# Patient Record
Sex: FEMALE | Race: WHITE
[De-identification: names, ages, dates, MRNs, and addresses within clinical notes are randomized per-mention and may not be internally consistent; named-entity substitution may affect disease eponyms.]

---

## 2017-03-30 ENCOUNTER — HOSPITAL ENCOUNTER (OUTPATIENT)
Dept: HOSPITAL 80 - FIMAGING | Age: 72
End: 2017-03-30
Attending: GENERAL ACUTE CARE HOSPITAL
Payer: COMMERCIAL

## 2017-03-30 DIAGNOSIS — R92.8: Primary | ICD-10-CM

## 2017-04-13 ENCOUNTER — HOSPITAL ENCOUNTER (OUTPATIENT)
Dept: HOSPITAL 80 - FIMAGING | Age: 72
End: 2017-04-13
Attending: INTERNAL MEDICINE
Payer: COMMERCIAL

## 2017-04-13 DIAGNOSIS — R92.8: ICD-10-CM

## 2017-04-13 DIAGNOSIS — Z12.39: Primary | ICD-10-CM

## 2017-04-21 ENCOUNTER — HOSPITAL ENCOUNTER (OUTPATIENT)
Dept: HOSPITAL 80 - FIMAGING | Age: 72
End: 2017-04-21
Attending: INTERNAL MEDICINE
Payer: COMMERCIAL

## 2017-04-21 DIAGNOSIS — N63: ICD-10-CM

## 2017-04-21 DIAGNOSIS — Z12.39: Primary | ICD-10-CM

## 2017-04-21 PROCEDURE — A9585 GADOBUTROL INJECTION: HCPCS

## 2017-04-21 PROCEDURE — 0159T: CPT

## 2017-04-21 PROCEDURE — C8908 MRI W/O FOL W/CONT, BREAST,: HCPCS

## 2018-01-11 ENCOUNTER — HOSPITAL ENCOUNTER (OUTPATIENT)
Dept: HOSPITAL 80 - BMCIMAGING | Age: 73
End: 2018-01-11
Attending: NURSE PRACTITIONER
Payer: COMMERCIAL

## 2018-01-11 DIAGNOSIS — R10.31: Primary | ICD-10-CM

## 2018-01-11 DIAGNOSIS — D25.9: ICD-10-CM

## 2018-02-23 ENCOUNTER — HOSPITAL ENCOUNTER (OUTPATIENT)
Dept: HOSPITAL 80 - BMCIMAGING | Age: 73
End: 2018-02-23
Attending: INTERNAL MEDICINE
Payer: COMMERCIAL

## 2018-02-23 DIAGNOSIS — E04.2: Primary | ICD-10-CM

## 2018-02-26 ENCOUNTER — HOSPITAL ENCOUNTER (OUTPATIENT)
Dept: HOSPITAL 80 - FIMAGING | Age: 73
End: 2018-02-26
Attending: PHYSICIAN ASSISTANT
Payer: COMMERCIAL

## 2018-02-26 DIAGNOSIS — M43.16: Primary | ICD-10-CM

## 2018-03-06 ENCOUNTER — HOSPITAL ENCOUNTER (OUTPATIENT)
Dept: HOSPITAL 80 - FED | Age: 73
Setting detail: OBSERVATION
Discharge: HOME | End: 2018-03-06
Attending: HOSPITALIST | Admitting: HOSPITALIST
Payer: COMMERCIAL

## 2018-03-06 VITALS — DIASTOLIC BLOOD PRESSURE: 54 MMHG | SYSTOLIC BLOOD PRESSURE: 101 MMHG

## 2018-03-06 DIAGNOSIS — R42: ICD-10-CM

## 2018-03-06 DIAGNOSIS — Z96.643: ICD-10-CM

## 2018-03-06 DIAGNOSIS — M45.9: ICD-10-CM

## 2018-03-06 DIAGNOSIS — E04.1: ICD-10-CM

## 2018-03-06 DIAGNOSIS — I65.23: ICD-10-CM

## 2018-03-06 DIAGNOSIS — R07.9: Primary | ICD-10-CM

## 2018-03-06 DIAGNOSIS — I10: ICD-10-CM

## 2018-03-06 DIAGNOSIS — K21.9: ICD-10-CM

## 2018-03-06 LAB
CK SERPL-CCNC: 256 IU/L (ref 0–156)
INR PPP: 0.94 (ref 0.83–1.16)
PLATELET # BLD: 191 10^3/UL (ref 150–400)
PROTHROMBIN TIME: 12.8 SEC (ref 12–15)

## 2018-03-06 PROCEDURE — 93880 EXTRACRANIAL BILAT STUDY: CPT

## 2018-03-06 PROCEDURE — G0378 HOSPITAL OBSERVATION PER HR: HCPCS

## 2018-03-06 PROCEDURE — 93017 CV STRESS TEST TRACING ONLY: CPT

## 2018-03-06 PROCEDURE — 99285 EMERGENCY DEPT VISIT HI MDM: CPT

## 2018-03-06 PROCEDURE — 71045 X-RAY EXAM CHEST 1 VIEW: CPT

## 2018-03-06 PROCEDURE — 93005 ELECTROCARDIOGRAM TRACING: CPT

## 2018-03-06 NOTE — PDDCSUM
Discharge Summary


Discharge Summary: 








Date of Admission: 3/6/18 9:30 a.m.


Date of Discharge: 3/6/18 4 p.m.





Discharge Diagnoses


1. Acute chest pain


2. Suspected GERD


3. High blood pressure


4. Goiter with hot nodule





Procedures


1. Exercise EKG stress test w/o inducible ischemia


2. Carotid/Vertebral US w/o any significant stenosis





Labs: TSH 0.9, dimer negative, trop negative x 2, , WBC 4,000, Hgb 16.3, 

Cr 0.7





Physical Exam: , AAOx3, pain 0/10, no distress, no tachypnea





Hospital Course: Ms. Lock presented w/ acute, central chest pain and was 

ruled out for ACS w/ negative troponins and no ischemia on EKG. She was ruled 

out for obstructive CAD w/ a normal EKG-exercise stress test, was ruled out for 

PE w/ neg dimer, and was ruled out for pulmonary etiology with negative CXR. 

Her chest discomfort is most likely 2/2 GERD, and she has had two episodes in 

the past several weeks. We discussed PPI preventative Rx, and she would prefer 

to trial PRN tums/H2 blocker, and I recommended she keep these on hand. She is 

already performing all non-pharmacologic methods of reducing GERD. Her BP was 

noted to be elevated on presentation, and I suspect that this is episodic 

elevated blood pressure which is situational and a direct response to 

discomfort. Her SBP delmer to 190 w/ exercise (likely 2/2 deconditioning) and was 

170 on presentation, but it improved to 110-120 w/o intervention, indicating 

that it is more of a response to stimulation/discomfort, as opposed to the 

cause of discomfort. Ms. Lock and I discussed this, and she has a detailed 

log of her BP values which she will continue, and share w/ her PCP. Outpatient 

ambulatory BP monitoring through [facility] may be useful to parse out 

whether she truly has hypertension. She has stopped her Methimazole over 

concerns with her BP, and I have encouraged her to discuss with Dr. Ernst in 

the short term, to avoid undertreating her hot nodule. In addition, the patient 

has been experiencing some facial paresthesias and frontal facial pressure/

ocular discomfort, and we performed anterior/posterior circulation US to ensure 

that there was no neurovascular cause. Her US was normal, and I recommend she 

continue this conversation with her PCP. 





Discharge medications: please see official discharge medication reconcilliation 

sheet in chart; of note, continue all home Rx.





Discharge instructions: please follow-up with PCP, Dr. Ernst, and Dr. Chan 

as scheduled. 





Of note, patient was evaluated by this physician on two separate encounters, > 

6 hours apart, on 3/6/18 for a combined H&P and DC after appropriate work-up 

had been performed.

## 2018-03-06 NOTE — EDPHY
H & P


Stated Complaint: CP off and on all night


Time Seen by Provider: 03/06/18 06:10


HPI/ROS: 





HPI





CHIEF COMPLAINT:  Chest discomfort





HISTORY OF PRESENT ILLNESS:  Patient is a 72-year-old female history of a goiter

, and no known history of cardiovascular disease she has never had an MI or 

stroke she presents emergency room with chest discomfort been on an off all 

night.  Intermittently.  Currently has had 4/10.  Describes it as a dull ache/

pressure in the center of her chest.  Sometimes she feels some discomfort in 

her left arm.  Denies any nausea vomiting.  Also reports on Friday she had some 

lightheadedness, high blood pressure and some chest discomfort.  She decided 

come the emergency room due to ongoing chest discomfort.  She did take her 

blood pressure at home and realized that was 170s.


Currently upon arrival she is hemodynamically stable no acute distress but does 

complain of 4/10 substernal chest discomfort.  Denies recent illness.  Denies 

pleuritic pain.  Denies cough or fever.





Past Medical History:  Thyroid disease goiter, hypertension





Past Surgical History:  Multiple previous surgeries including appendectomy and 

orthopedic surgeries.





Social History:  Denies drugs alcohol tobacco.





Family History:  Noncontributory








ROS   


REVIEW OF SYSTEMS:


A comprehensive 10 point review of systems is otherwise negative aside from 

elements mentioned in the history of present illness.








Exam   


Constitutional  appears well nontoxic no acute distress, triage nursing summary 

reviewed, vital signs reviewed, awake/alert. 


Eyes   normal conjunctivae and sclera, EOMI, PERRLA. 


HENT   neck shows a goiter present.  Prominent neck veins.  normal inspection, 

atraumatic, moist mucus membranes, no epistaxis, neck supple/ no meningismus, 

no raccoon eyes. 


Respiratory   clear to auscultation bilaterally, normal breath sounds, no 

respiratory distress, no wheezing. 


Cardiovascular   rate normal, regular rhythm, no murmur, no edema, distal 

pulses normal. 


Gastrointestinal   soft, non-tender, no rebound, no guarding, normal bowel 

sounds, no distension, no pulsatile mass. 


Genitourinary   no CVA tenderness. 


Musculoskeletal  no midline vertebral tenderness, full range of motion, no calf 

swelling, no tenderness of extremities, no meningismus, good pulses, 

neurovascularly intact.


Skin   pink, warm, & dry, no rash, skin atraumatic. 


Neurologic   awake, alert and oriented x 3, AAOx3, moves all 4 extremities 

equally, motor intact, sensory intact, CN II-XII intact, normal cerebellar, 

normal vision, normal speech. 


Psychiatric   normal mood/affect. 


Heme/Lymph/Immune   no lymphadenopathy.





Differential diagnosis includes but is not limited to:  ACS, atypical chest pain

, pneumothorax, pneumonia, pulmonary embolism, aortic dissection, congestive 

heart failure, tumor, musculoskeletal pain, esophageal pain, GERD, peptic ulcer 

disease, pancreatitis





Medical Decision Making:  Plan for this patient IV establishment obtain EKG in 

full cardiac monitor, check troponin D-dimer, chest x-ray, full-dose aspirin 

will be given additionally nitroglycerin to see if this improves her chest 

discomfort.  Rule out acute coronary syndrome.





Re-evaluation:








EKG interpretation by me on record in [facility] system.  Impression time of 

EKG 6:19 a.m., sinus rhythm rate of 89 I do not appreciate ST elevation.  No ST 

depression.  No significant T-wave abnormalities.








0722:  Re-evaluation at this time patient resting comfortably.  Full-dose 

aspirin and nitroglycerin for chest pain-free at this time.  Does feel little 

lightheaded from the nitroglycerin.  Vital signs are stable.


EKG is nonischemic.  Chest x-ray reviewed on appreciate acute cardiopulmonary 

disease.  Troponin D-dimer pending at this time.





I did see the hospitalist service agrees to admit this patient for chest pain 

evaluation.  Patient agrees for admission as well.


Source: Patient, Family





- Personal History


Current Tetanus/Diphtheria Vaccine: Yes


Current Tetanus Diphtheria and Acellular Pertussis (TDAP): Yes





- Medical/Surgical History


Hx Asthma: No


Hx Chronic Respiratory Disease: No


Hx Diabetes: No


Hx Cardiac Disease: No


Hx Renal Disease: No


Hx Cirrhosis: No


Hx Alcoholism: No


Hx HIV/AIDS: No


Hx Splenectomy or Spleen Trauma: No


Other PMH: breast biopsy, laminectomy, appendectomy, bilat knee replacement, 

tendon release in right ring finger, left thumb release, neurectomy left foot, 

right carpel tunnel, bunionectomy right foot, neurectomy left tenotomies





- Social History


Smoking Status: Never smoked


Constitutional: 


 Initial Vital Signs











Temperature (C)  36.4 C   03/06/18 06:09


 


Heart Rate  88   03/06/18 06:09


 


Respiratory Rate  16   03/06/18 06:09


 


Blood Pressure  165/105 H  03/06/18 06:09


 


O2 Sat (%)  96   03/06/18 06:09








 











O2 Delivery Mode               Room Air














Allergies/Adverse Reactions: 


 





acetaminophen [From Tylenol] Allergy (Verified 03/06/18 08:13)


 Fever


celecoxib [From Celebrex] Allergy (Verified 03/06/18 08:13)


 cough


diclofenac [From Voltaren] Allergy (Verified 03/06/18 08:13)


 hypertension


indomethacin [From Indocin] Allergy (Verified 03/06/18 08:13)


 stomach upset/mood


meperidine [From Demerol] Allergy (Verified 03/06/18 08:13)


 "instant emesis"


nabumetone [From Relafen] Allergy (Verified 03/06/18 08:13)


 Hives


oxycodone [From Percocet] Allergy (Verified 03/06/18 08:13)


 Vomiting


propoxyphene [From Darvon] Allergy (Verified 03/06/18 08:13)


 "instant emesis"


rofecoxib [From Vioxx] Allergy (Verified 03/06/18 08:13)


 becomes angry


sulfasalazine [From Azulfidine] Allergy (Verified 03/06/18 08:13)


 headache


sulindac [From Clinoril] Allergy (Verified 03/06/18 08:13)


 body aches


tramadol Allergy (Verified 03/06/18 08:13)


 extreme respiratory suppression/"weird feeling"


tolmentin Allergy (Uncoded 03/06/18 08:13)


 stomach upset








Home Medications: 














 Medication  Instructions  Recorded


 


C/E/Zn/Cu/OM3/DHA/EPA/LUT/ZEAX 1 each PO DAILY 03/06/18





[Preservision Areds 2 Softgel]  


 


Cholecalciferol Vit D3 [Vitamin D3 4,000 units PO DAILY 03/06/18





2000 units tab (OTC)]  


 


Herbals/Supplements -Info Only 1 ea PO DAILY 03/06/18


 


Multivitamins [Multivitamin (*)] 1 each PO DAILY 03/06/18


 


Omega-3 Fatty Acids [Fish Oil 1000 2,000 mg PO DAILY 03/06/18





mg (*)]  


 


Vitamin B Complex/Folic Acid 0.4 mg PO Q2D 03/06/18





[B-Complex Tablet]  














Medical Decision Making





- Data Points


Laboratory Results: 


 Laboratory Results





 03/06/18 06:45 





 03/06/18 06:45 








Medications Given: 


 








Discontinued Medications





Aspirin Buffered (Aspirin Ec)  325 mg PO EDNOW ONE


   Stop: 03/06/18 06:25


   Last Admin: 03/06/18 06:37 Dose:  325 mg


Enoxaparin Sodium (Lovenox)  40 mg SC DAILY [place]


   Stop: 09/02/18 08:59


   Last Admin: 03/06/18 13:26 Dose:  Not Given


Nitroglycerin (Nitrostat)  0.4 mg SL EDNOW ONE


   Stop: 03/06/18 06:25


   Last Admin: 03/06/18 06:49 Dose:  0.4 mg








Departure





- Departure


Disposition: [facility] Inpatient Acute


Clinical Impression: 


Chest pain


Qualifiers:


 Chest pain type: unspecified Qualified Code(s): R07.9 - Chest pain, unspecified





Condition: Good

## 2018-03-06 NOTE — CPEKG
Heart Rate: 89

RR Interval: 674

P-R Interval: 168

QRSD Interval: 92

QT Interval: 376

QTC Interval: 458

P Axis: 71

QRS Axis: 33

T Wave Axis: 53

EKG Severity - NORMAL ECG -

EKG Impression: SINUS RHYTHM

Electronically Signed By: Vinnie San 06-Mar-2018 06:41:18

## 2018-03-06 NOTE — CPR
[f 
rep st]



                                                  NONINVASIVE CARDIAC PROCEDURE 
REPORT





DATE OF PROCEDURE:  03/06/2018



STUDY PERFORMED:  Exercise treadmill stress test.



REASON FOR TEST:  Chest pain.



ORDERING PHYSICIAN:  Hospitalist.



STUDY:  Resting EKG shows a regular sinus rhythm with a ventricular rate of 98, 
resting blood pressure 130/60.  She has no chest pain prior to getting on the 
treadmill. 



EXERCISE PORTION:  She was exercised according to the Fausto protocol for a 
total of 5 minutes.  Her heart rate peaked at 170, which was over 100% her 
maximal heart rate.  She rated the exercise a 7/10 at that point.  Exercise 
blood pressure 190/80.  Peak met level 6.2.  She had an occasional PVC during 
exercise.  She remained asymptomatic. 



RECOVERY:  She had a prolonged recovery due to heart rate response.  Her heart 
rate went down to 120 and, then over the next 10 minutes, finally down to 106.  
Recovery blood pressure 138/60.  She was asymptomatic.  She tolerated the 
exercise well with no chest pain or shortness of breath during exercise or in 
recovery.



CONCLUSION:  Good exercise response with abnormal heart rate recovery taking up 
to 10 minutes to reach a resting heart rate of 106.  At this time, she 
currently is stable to return to her room.





Job #:  [id number]

Supervising Physician Alonzo Ramsay MD
MTDD

## 2018-03-06 NOTE — PDGENHP
History and Physical





- Chief Complaint


Acute chest pain





- History of Present Illness


Primary care provider:  Dr. Carolyn Yang


Primary rheumatologist:  Dr. Dwain Chan


Primary endocrinologist:  Dr. Kia Ernst





HPI: 71 yo F p/w acute chest pain characterized as a dull, aching sensation 

located deeply underneath her sternum, w/ onset overnight prior to arrival. 

Duration was constant, and continued to awaken patient from sleep. On the 

evening prior, she had eaten dinner at least 3 hours prior to bed, did not have 

a stressful evening, and did not consume alcohol or NSAIDs. She did have some 

lightheadedness prior to bed, and checked her SBP: it was 140. During the night

, at rest, she experienced the aforementioned pain, with assoc shortness of 

breath, and lightheadedness upon getting up this AM. She rechecked her SBP and 

it was 170. 





During the past week, her exercise tolerance has remained at her baseline, w/o 

any chest symptoms. She has recently been dealing with dry eyes and frontal 

facial pressure, as well as left side facial paresthesias. She has also 

recently stopped her methimazole due to her concerns about her SBP (ranging 120-

170). She denies any infectious or GI symptoms.





History Information





- Allergies/Home Medication List


Allergies/Adverse Reactions: 








acetaminophen [From Tylenol] Allergy (Verified 03/06/18 08:13)


 Fever


celecoxib [From Celebrex] Allergy (Verified 03/06/18 08:13)


 cough


diclofenac [From Voltaren] Allergy (Verified 03/06/18 08:13)


 hypertension


indomethacin [From Indocin] Allergy (Verified 03/06/18 08:13)


 stomach upset/mood


meperidine [From Demerol] Allergy (Verified 03/06/18 08:13)


 "instant emesis"


nabumetone [From Relafen] Allergy (Verified 03/06/18 08:13)


 Hives


oxycodone [From Percocet] Allergy (Verified 03/06/18 08:13)


 Vomiting


propoxyphene [From Darvon] Allergy (Verified 03/06/18 08:13)


 "instant emesis"


rofecoxib [From Vioxx] Allergy (Verified 03/06/18 08:13)


 becomes angry


sulfasalazine [From Azulfidine] Allergy (Verified 03/06/18 08:13)


 headache


sulindac [From Clinoril] Allergy (Verified 03/06/18 08:13)


 body aches


tramadol Allergy (Verified 03/06/18 08:13)


 extreme respiratory suppression/"weird feeling"


tolmentin Allergy (Uncoded 03/06/18 08:13)


 stomach upset





Home Medications: 








C/E/Zn/Cu/OM3/DHA/EPA/LUT/ZEAX [Preservision Areds 2 Softgel] 1 each PO DAILY 03 /06/18 [Last Taken Unknown]


Cholecalciferol Vit D3 [Vitamin D3 2000 units tab (OTC)] 4,000 units PO DAILY 03 /06/18 [Last Taken Unknown]


Herbals/Supplements -Info Only 1 ea PO DAILY 03/06/18 [Last Taken Unknown]


Multivitamins [Multivitamin (*)] 1 each PO DAILY 03/06/18 [Last Taken Unknown]


Omega-3 Fatty Acids [Fish Oil 1000 mg (*)] 2,000 mg PO DAILY 03/06/18 [Last 

Taken Unknown]


Vitamin B Complex/Folic Acid [B-Complex Tablet] 0.4 mg PO Q2D 03/06/18 [Last 

Taken Unknown]





I have personally reviewed and updated: family history, medical history, social 

history, surgical history





- Past Medical History


Additional medical history: HLA-B27 positive w/ ankylosing spondylosis.  HTN 

not on Rx.  GERD on H2 blocker, previously on PPI.  Goiter w/ hot nodule, 

prescribed methimazole





- Surgical History


Additional surgical history: APPY.  Orthopedic





- Family History


Additional family history: Father w/ CHF in 90s, Mother  w/ AVR s/p josué 

fever





- Social History


Smoking Status: Never smoked


Alcohol Use: Occasionally


Drug Use: None


Additional social history: physically active, walks regularly





Review of Systems


Review of Systems: 





ROS: 10pt was reviewed & negative except for what was stated in HPI & below


Cardiac: Reports: chest pain, lightheadedness


Respiratory: Reports: shortness of breath





Physical Exam


Physical Exam: 

















Temp Pulse Resp BP Pulse Ox


 


 36.4 C   84   16   115/73   95 


 


 03/06/18 06:09  03/06/18 09:06  03/06/18 09:06  03/06/18 09:06  03/06/18 09:06











Constitutional: no apparent distress, appears nourished, not in pain


Eyes: PERRL, anicteric sclera, EOMI


Ears, Nose, Mouth, Throat: moist mucous membranes, hearing normal, ears appear 

normal, no oral mucosal ulcers


Cardiovascular: regular rate and rhythym, no murmur, rub, or gallop, No edema


Respiratory: no respiratory distress, no rales or rhonchi, clear to auscultation


Gastrointestinal: normoactive bowel sounds, soft, non-tender abdomen, no 

palpable masses


Skin: No abrasion (on chest or back), No rash


Musculoskeletal: other (no tenderness over anterior pectoralis, normal ROM 

bilat shoulders w/o pain, no tenderness over back intercostals)


Neurologic: AAOx3, sensation intact bilaterally, No weakness, No facial droop


Psychiatric: interacting appropriately, not anxious, not encephalopathic, 

thought process linear





Lab Data & Imaging Review





 03/06/18 06:45





 03/06/18 06:45














WBC  3.95 10^3/uL (3.80-9.50)   03/06/18  06:45    


 


RBC  5.29 10^6/uL (4.18-5.33)   03/06/18  06:45    


 


Hgb  16.3 g/dL (12.6-16.3)   03/06/18  06:45    


 


Hct  47.9 % (38.0-47.0)  H  03/06/18  06:45    


 


MCV  90.5 fL (81.5-99.8)   03/06/18  06:45    


 


MCH  30.8 pg (27.9-34.1)   03/06/18  06:45    


 


MCHC  34.0 g/dL (32.4-36.7)   03/06/18  06:45    


 


RDW  12.8 % (11.5-15.2)   03/06/18  06:45    


 


Plt Count  191 10^3/uL (150-400)   03/06/18  06:45    


 


MPV  9.4 fL (8.7-11.7)   03/06/18  06:45    


 


Neut % (Auto)  62.0 % (39.3-74.2)   03/06/18  06:45    


 


Lymph % (Auto)  29.4 % (15.0-45.0)   03/06/18  06:45    


 


Mono % (Auto)  6.8 % (4.5-13.0)   03/06/18  06:45    


 


Eos % (Auto)  1.0 % (0.6-7.6)   03/06/18  06:45    


 


Baso % (Auto)  0.5 % (0.3-1.7)   03/06/18  06:45    


 


Nucleat RBC Rel Count  0.0 % (0.0-0.2)   03/06/18  06:45    


 


Absolute Neuts (auto)  2.45 10^3/uL (1.70-6.50)   03/06/18  06:45    


 


Absolute Lymphs (auto)  1.16 10^3/uL (1.00-3.00)   03/06/18  06:45    


 


Absolute Monos (auto)  0.27 10^3/uL (0.30-0.80)  L  03/06/18  06:45    


 


Absolute Eos (auto)  0.04 10^3/uL (0.03-0.40)   03/06/18  06:45    


 


Absolute Basos (auto)  0.02 10^3/uL (0.02-0.10)   03/06/18  06:45    


 


Absolute Nucleated RBC  0.00 10^3/uL (0-0.01)   03/06/18  06:45    


 


Immature Gran %  0.3 % (0.0-1.1)   03/06/18  06:45    


 


Immature Gran #  0.01 10^3/uL (0.00-0.10)   03/06/18  06:45    


 


PT  12.8 SEC (12.0-15.0)   03/06/18  06:45    


 


INR  0.94  (0.83-1.16)   03/06/18  06:45    


 


APTT  25.5 SEC (23.0-38.0)   03/06/18  06:45    


 


D-Dimer  < 0.27 ug/mLFEU (0.00-0.50)   03/06/18  06:45    


 


Sodium  145 mEq/L (135-145)   03/06/18  06:45    


 


Potassium  4.3 mEq/L (3.5-5.2)   03/06/18  06:45    


 


Chloride  107 mEq/L ()   03/06/18  06:45    


 


Carbon Dioxide  26 mEq/l (22-31)   03/06/18  06:45    


 


Anion Gap  12 mEq/L (8-16)   03/06/18  06:45    


 


BUN  22 mg/dL (7-23)   03/06/18  06:45    


 


Creatinine  0.7 mg/dL (0.6-1.0)   03/06/18  06:45    


 


Estimated GFR  > 60   03/06/18  06:45    


 


Glucose  86 mg/dL ()   03/06/18  06:45    


 


Calcium  9.5 mg/dL (8.5-10.4)   03/06/18  06:45    


 


Magnesium  2.2 mg/dL (1.6-2.3)   03/06/18  06:45    


 


Total Bilirubin  0.5 mg/dL (0.1-1.4)   03/06/18  06:45    


 


Conjugated Bilirubin  0.3 mg/dL (0.0-0.5)   03/06/18  06:45    


 


Unconjugated Bilirubin  0.2 mg/dL (0.0-1.1)   03/06/18  06:45    


 


AST  42 IU/L (14-46)   03/06/18  06:45    


 


ALT  38 IU/L (9-52)   03/06/18  06:45    


 


Alkaline Phosphatase  71 IU/L ()   03/06/18  06:45    


 


Creatine Kinase  256 IU/L (0-156)  H  03/06/18  06:45    


 


CK-MB (CK-2) Fraction  6.10 ng/mL (0.00-3.19)  H  03/06/18  06:45    


 


CK-MB (CK-2) %  2.4 % (0.0-4.0)   03/06/18  06:45    


 


Creatine Kinase Interp  NEGATIVE  (NEGATIVE)   03/06/18  06:45    


 


Troponin I  < 0.012 ng/mL (0.000-0.034)   03/06/18  06:48    


 


NT-Pro-B Natriuret Pep  68 pg/mL (0-125)   03/06/18  06:45    


 


Total Protein  7.2 g/dL (6.3-8.2)   03/06/18  06:45    


 


Albumin  4.4 g/dL (3.5-5.0)   03/06/18  06:45    


 


Lipase  177 IU/L ()   03/06/18  06:45    


 


TSH  0.949 uIU/mL (0.465-4.680)   03/06/18  06:45    








Visualized and Interpreted Chest x-ray results: Yes


Chest X-Ray results: no infiltrate (small granulomas)


Visualized and Interpreted EKG results: Yes


EKG Interpretation: Positive for: normal sinsus rhythm





Assessment & Plan


Assessment: 








71 yo F p/w acute chest pain, lightheadedness








Plan: 





# Chest pain. Acute, new problem, further w/u indicated. 2 episodes, recent 

changes in BP, r/o ACS and obstructive CAD


- last exercise stress test 3 years ago w/ reportedly normal echo, EKG testing 

was neg at that time


- repeat EKG exercise stress today after neg trop at 10 a.m.


- keep NPO w/ IVF


- prn morphine in interim, currently chest pain free


- s/p 


- if stress neg, suspect this is GERD and recommend either PRN tums/H2 vs. PPI, 

TBD and f/u PCP


- dimer r/o PE





# Lightheadedness. Acute, new problem, further w/u. Potentially separate issue 

to above, describing some paresthesias and potentially posterior neurovascular 

symptoms, warrants r/o w/ CUS





# HTN. Recently labile, basal SBP is reassuring () and fluctuations 

likely 2/2 discomfort, d/w patient and 


- recommend ongoing monitoring w/ PCP


- no rx recommended at this time





# Goiter w/ hot nodule. Fluctuations unlikely 2/2 hot nodule, as overt 

hyperthyroidism usually p/w constant hypertension/tachycardia


- reviewed outside records including 2/14/18 labs demonstrating TSH 0.4 w/ 

normal T4/T3 and neg w/u for Grave's disease


- currently TSH is 0.9


- recommended she f/u Dr. Ernst in near term, given her concerns about 

methimazole





Diet. NPO w/ IVF


Code. Full


PPx. Mod risk, lovenox 40


Dispo. ADD 3/6 later today, depending on w/u of above.





Discussed w/ Alice Zelaya, hospitalist provider, she has signed patient out 

to me for evaluation.

## 2018-03-14 ENCOUNTER — HOSPITAL ENCOUNTER (OUTPATIENT)
Dept: HOSPITAL 80 - FIMAGING | Age: 73
End: 2018-03-14
Attending: PHYSICIAN ASSISTANT
Payer: COMMERCIAL

## 2018-03-14 DIAGNOSIS — M48.061: ICD-10-CM

## 2018-03-14 DIAGNOSIS — M51.16: Primary | ICD-10-CM

## 2018-03-14 DIAGNOSIS — M51.14: ICD-10-CM

## 2018-03-14 DIAGNOSIS — M46.96: ICD-10-CM

## 2018-03-14 DIAGNOSIS — M46.94: ICD-10-CM

## 2018-03-14 PROCEDURE — A9585 GADOBUTROL INJECTION: HCPCS

## 2018-03-14 PROCEDURE — 72158 MRI LUMBAR SPINE W/O & W/DYE: CPT

## 2019-03-18 ENCOUNTER — HOSPITAL ENCOUNTER (OUTPATIENT)
Dept: HOSPITAL 80 - FIMAGING | Age: 74
End: 2019-03-18
Attending: INTERNAL MEDICINE
Payer: COMMERCIAL

## 2019-03-18 DIAGNOSIS — Z12.31: Primary | ICD-10-CM

## 2019-03-18 DIAGNOSIS — Z80.3: ICD-10-CM

## 2019-03-18 DIAGNOSIS — R92.8: ICD-10-CM

## 2019-04-10 ENCOUNTER — HOSPITAL ENCOUNTER (OUTPATIENT)
Dept: HOSPITAL 80 - FIMAGING | Age: 74
End: 2019-04-10
Attending: INTERNAL MEDICINE
Payer: COMMERCIAL

## 2019-04-10 DIAGNOSIS — R92.8: Primary | ICD-10-CM

## 2019-04-10 DIAGNOSIS — Z80.3: ICD-10-CM
